# Patient Record
Sex: FEMALE | URBAN - METROPOLITAN AREA
[De-identification: names, ages, dates, MRNs, and addresses within clinical notes are randomized per-mention and may not be internally consistent; named-entity substitution may affect disease eponyms.]

---

## 2021-02-14 ENCOUNTER — IMMUNIZATIONS (OUTPATIENT)
Dept: FAMILY MEDICINE CLINIC | Facility: HOSPITAL | Age: 76
End: 2021-02-14

## 2021-02-14 DIAGNOSIS — Z23 ENCOUNTER FOR IMMUNIZATION: Primary | ICD-10-CM

## 2021-02-14 PROCEDURE — 91300 SARS-COV-2 / COVID-19 MRNA VACCINE (PFIZER-BIONTECH) 30 MCG: CPT

## 2021-02-14 PROCEDURE — 0001A SARS-COV-2 / COVID-19 MRNA VACCINE (PFIZER-BIONTECH) 30 MCG: CPT

## 2021-03-09 ENCOUNTER — IMMUNIZATIONS (OUTPATIENT)
Dept: FAMILY MEDICINE CLINIC | Facility: HOSPITAL | Age: 76
End: 2021-03-09

## 2021-03-09 DIAGNOSIS — Z23 ENCOUNTER FOR IMMUNIZATION: Primary | ICD-10-CM

## 2021-03-09 PROCEDURE — 91300 SARS-COV-2 / COVID-19 MRNA VACCINE (PFIZER-BIONTECH) 30 MCG: CPT

## 2021-03-09 PROCEDURE — 0002A SARS-COV-2 / COVID-19 MRNA VACCINE (PFIZER-BIONTECH) 30 MCG: CPT

## 2023-07-04 ENCOUNTER — HOSPITAL ENCOUNTER (EMERGENCY)
Facility: HOSPITAL | Age: 78
Discharge: HOME | End: 2023-07-04
Attending: EMERGENCY MEDICINE | Admitting: EMERGENCY MEDICINE
Payer: MEDICARE

## 2023-07-04 ENCOUNTER — APPOINTMENT (EMERGENCY)
Dept: CT IMAGING | Facility: HOSPITAL | Age: 78
End: 2023-07-04
Payer: MEDICARE

## 2023-07-04 ENCOUNTER — APPOINTMENT (EMERGENCY)
Dept: RADIOLOGY | Facility: HOSPITAL | Age: 78
End: 2023-07-04
Payer: MEDICARE

## 2023-07-04 VITALS
TEMPERATURE: 98.3 F | RESPIRATION RATE: 17 BRPM | BODY MASS INDEX: 41.54 KG/M2 | OXYGEN SATURATION: 95 % | WEIGHT: 220 LBS | HEART RATE: 82 BPM | DIASTOLIC BLOOD PRESSURE: 66 MMHG | HEIGHT: 61 IN | SYSTOLIC BLOOD PRESSURE: 154 MMHG

## 2023-07-04 DIAGNOSIS — I10 HYPERTENSION: ICD-10-CM

## 2023-07-04 DIAGNOSIS — R42 VERTIGO: ICD-10-CM

## 2023-07-04 DIAGNOSIS — R42 DIZZINESS: Primary | ICD-10-CM

## 2023-07-04 LAB
ALBUMIN SERPL BCP-MCNC: 3.9 G/DL (ref 3.5–5)
ALP SERPL-CCNC: 86 U/L (ref 34–104)
ALT SERPL W P-5'-P-CCNC: 14 U/L (ref 7–52)
AMORPH URATE CRY URNS QL MICRO: NORMAL
ANION GAP SERPL CALCULATED.3IONS-SCNC: 8 MMOL/L
AST SERPL W P-5'-P-CCNC: 17 U/L (ref 13–39)
BACTERIA UR QL AUTO: NORMAL /HPF
BASOPHILS # BLD AUTO: 0.03 THOUSANDS/ÂΜL (ref 0–0.1)
BASOPHILS NFR BLD AUTO: 0 % (ref 0–1)
BILIRUB SERPL-MCNC: 0.33 MG/DL (ref 0.2–1)
BILIRUB UR QL STRIP: NEGATIVE
BUN SERPL-MCNC: 11 MG/DL (ref 5–25)
CALCIUM SERPL-MCNC: 9.8 MG/DL (ref 8.4–10.2)
CARDIAC TROPONIN I PNL SERPL HS: 4 NG/L
CHLORIDE SERPL-SCNC: 105 MMOL/L (ref 96–108)
CLARITY UR: CLEAR
CO2 SERPL-SCNC: 27 MMOL/L (ref 21–32)
COLOR UR: ABNORMAL
CREAT SERPL-MCNC: 0.83 MG/DL (ref 0.6–1.3)
EOSINOPHIL # BLD AUTO: 0.12 THOUSAND/ÂΜL (ref 0–0.61)
EOSINOPHIL NFR BLD AUTO: 2 % (ref 0–6)
ERYTHROCYTE [DISTWIDTH] IN BLOOD BY AUTOMATED COUNT: 13.2 % (ref 11.6–15.1)
GFR SERPL CREATININE-BSD FRML MDRD: 68 ML/MIN/1.73SQ M
GLUCOSE SERPL-MCNC: 149 MG/DL (ref 65–140)
GLUCOSE UR STRIP-MCNC: NEGATIVE MG/DL
HCT VFR BLD AUTO: 39.7 % (ref 34.8–46.1)
HGB BLD-MCNC: 12.4 G/DL (ref 11.5–15.4)
HGB UR QL STRIP.AUTO: ABNORMAL
IMM GRANULOCYTES # BLD AUTO: 0.02 THOUSAND/UL (ref 0–0.2)
IMM GRANULOCYTES NFR BLD AUTO: 0 % (ref 0–2)
KETONES UR STRIP-MCNC: NEGATIVE MG/DL
LEUKOCYTE ESTERASE UR QL STRIP: NEGATIVE
LYMPHOCYTES # BLD AUTO: 1.83 THOUSANDS/ÂΜL (ref 0.6–4.47)
LYMPHOCYTES NFR BLD AUTO: 22 % (ref 14–44)
MCH RBC QN AUTO: 27.3 PG (ref 26.8–34.3)
MCHC RBC AUTO-ENTMCNC: 31.2 G/DL (ref 31.4–37.4)
MCV RBC AUTO: 87 FL (ref 82–98)
MONOCYTES # BLD AUTO: 0.44 THOUSAND/ÂΜL (ref 0.17–1.22)
MONOCYTES NFR BLD AUTO: 5 % (ref 4–12)
NEUTROPHILS # BLD AUTO: 5.79 THOUSANDS/ÂΜL (ref 1.85–7.62)
NEUTS SEG NFR BLD AUTO: 71 % (ref 43–75)
NITRITE UR QL STRIP: NEGATIVE
NON-SQ EPI CELLS URNS QL MICRO: NORMAL /HPF
NRBC BLD AUTO-RTO: 0 /100 WBCS
PH UR STRIP.AUTO: 6.5 [PH]
PLATELET # BLD AUTO: 213 THOUSANDS/UL (ref 149–390)
PMV BLD AUTO: 10.2 FL (ref 8.9–12.7)
POTASSIUM SERPL-SCNC: 3.7 MMOL/L (ref 3.5–5.3)
PROT SERPL-MCNC: 8.2 G/DL (ref 6.4–8.4)
PROT UR STRIP-MCNC: NEGATIVE MG/DL
RBC # BLD AUTO: 4.55 MILLION/UL (ref 3.81–5.12)
RBC #/AREA URNS AUTO: NORMAL /HPF
SODIUM SERPL-SCNC: 140 MMOL/L (ref 135–147)
SP GR UR STRIP.AUTO: 1.01 (ref 1–1.03)
UROBILINOGEN UR STRIP-ACNC: <2 MG/DL
WBC # BLD AUTO: 8.23 THOUSAND/UL (ref 4.31–10.16)
WBC #/AREA URNS AUTO: NORMAL /HPF

## 2023-07-04 PROCEDURE — 71045 X-RAY EXAM CHEST 1 VIEW: CPT

## 2023-07-04 PROCEDURE — 70496 CT ANGIOGRAPHY HEAD: CPT

## 2023-07-04 PROCEDURE — 93005 ELECTROCARDIOGRAM TRACING: CPT

## 2023-07-04 PROCEDURE — 84484 ASSAY OF TROPONIN QUANT: CPT | Performed by: EMERGENCY MEDICINE

## 2023-07-04 PROCEDURE — 81001 URINALYSIS AUTO W/SCOPE: CPT | Performed by: EMERGENCY MEDICINE

## 2023-07-04 PROCEDURE — G1004 CDSM NDSC: HCPCS

## 2023-07-04 PROCEDURE — 70498 CT ANGIOGRAPHY NECK: CPT

## 2023-07-04 PROCEDURE — 36415 COLL VENOUS BLD VENIPUNCTURE: CPT | Performed by: EMERGENCY MEDICINE

## 2023-07-04 PROCEDURE — 85025 COMPLETE CBC W/AUTO DIFF WBC: CPT | Performed by: EMERGENCY MEDICINE

## 2023-07-04 PROCEDURE — 80053 COMPREHEN METABOLIC PANEL: CPT | Performed by: EMERGENCY MEDICINE

## 2023-07-04 RX ORDER — DIAZEPAM 2 MG/1
2 TABLET ORAL ONCE
Status: COMPLETED | OUTPATIENT
Start: 2023-07-04 | End: 2023-07-04

## 2023-07-04 RX ORDER — PRAVASTATIN SODIUM 80 MG/1
80 TABLET ORAL DAILY
COMMUNITY

## 2023-07-04 RX ORDER — LOSARTAN POTASSIUM 25 MG/1
25 TABLET ORAL ONCE
Status: DISCONTINUED | OUTPATIENT
Start: 2023-07-04 | End: 2023-07-04 | Stop reason: HOSPADM

## 2023-07-04 RX ORDER — MONTELUKAST SODIUM 10 MG/1
10 TABLET ORAL
COMMUNITY

## 2023-07-04 RX ORDER — OMEPRAZOLE 20 MG/1
20 CAPSULE, DELAYED RELEASE ORAL DAILY
COMMUNITY

## 2023-07-04 RX ORDER — CARVEDILOL PHOSPHATE 40 MG/1
40 CAPSULE, EXTENDED RELEASE ORAL DAILY
COMMUNITY

## 2023-07-04 RX ADMIN — IOHEXOL 85 ML: 350 INJECTION, SOLUTION INTRAVENOUS at 20:15

## 2023-07-04 RX ADMIN — DIAZEPAM 2 MG: 2 TABLET ORAL at 19:08

## 2023-07-04 RX ADMIN — DIAZEPAM 2 MG: 2 TABLET ORAL at 20:48

## 2023-07-04 NOTE — Clinical Note
Case was discussed with hospitalist and the patient's admission status was agreed to be Admission Status: observation status to the service of Dr. Kelley Castanon .

## 2023-07-04 NOTE — ED PROVIDER NOTES
History  Chief Complaint   Patient presents with   • Dizziness     Patient presents to the ED with c/o vertigo episode that began through the night last evening, states meclizine 30 minutes PTA with slight but minimal relief      Patient is a 69year old female who presents with dizziness. Patient reports more and more frequent episodes of dizziness. Most recent episode started last night while sleeping over her daughter's house. States that she woke up in the middle of the night and had room spinning sensation. Went back to sleep, but throughout today, any time she moved, she would get the dizziness again- it is to the degree that she cannot function. Took 3 pills of meclizine 30 minutes prior to arrival to the ED- no change in symptoms. States that she has right ear pain that is ongoing x few weeks. No change in the pain with this particular episode of vertigo. Denies HA, numbness, weakness, nausea, vomiting, changes in vision. Prior to Admission Medications   Prescriptions Last Dose Informant Patient Reported? Taking? CLONAZEPAM PO 7/4/2023  Yes Yes   Sig: Take 0.5 mg by mouth 2 (two) times a day   Dulaglutide (TRULICITY SC)  Self Yes No   Sig: Inject under the skin once a week   LOSARTAN POTASSIUM PO 7/3/2023  Yes Yes   Sig: Take 25 mg by mouth daily   MECLIZINE HCL PO 7/4/2023  Yes Yes   Sig: Take by mouth   carvedilol (COREG CR) 40 MG 24 hr capsule Not Taking  Yes No   Sig: Take 40 mg by mouth daily   Patient not taking: Reported on 7/4/2023   montelukast (SINGULAIR) 10 mg tablet 7/3/2023  Yes Yes   Sig: Take 10 mg by mouth   omeprazole (PriLOSEC) 20 mg delayed release capsule 7/3/2023  Yes Yes   Sig: Take 20 mg by mouth daily   pravastatin (PRAVACHOL) 80 mg tablet 7/3/2023  Yes Yes   Sig: Take 80 mg by mouth daily      Facility-Administered Medications: None       Past Medical History:   Diagnosis Date   • Hypertension        History reviewed. No pertinent surgical history.     History reviewed. No pertinent family history. I have reviewed and agree with the history as documented. E-Cigarette/Vaping     E-Cigarette/Vaping Substances     Social History     Tobacco Use   • Smoking status: Former     Types: Cigarettes   • Smokeless tobacco: Never   Substance Use Topics   • Alcohol use: Not Currently   • Drug use: Not Currently       Review of Systems   Constitutional: Negative for chills and fever. HENT: Positive for ear pain. Eyes: Negative for photophobia and visual disturbance. Respiratory: Negative for shortness of breath. Cardiovascular: Negative for chest pain. Gastrointestinal: Negative for abdominal pain, nausea and vomiting. Musculoskeletal: Negative for back pain and neck pain. Neurological: Positive for dizziness. Negative for seizures, syncope, facial asymmetry, speech difficulty, weakness, light-headedness, numbness and headaches. Psychiatric/Behavioral: Negative for confusion. Physical Exam  Physical Exam  Vitals and nursing note reviewed. Constitutional:       General: She is not in acute distress. Appearance: Normal appearance. She is not ill-appearing, toxic-appearing or diaphoretic. HENT:      Head: Normocephalic and atraumatic. Mouth/Throat:      Mouth: Mucous membranes are moist.   Eyes:      Extraocular Movements: Extraocular movements intact. Right eye: No nystagmus. Left eye: No nystagmus. Conjunctiva/sclera: Conjunctivae normal.      Pupils: Pupils are equal, round, and reactive to light. Cardiovascular:      Rate and Rhythm: Normal rate and regular rhythm. Pulses: Normal pulses. Heart sounds: Normal heart sounds. No murmur heard. Pulmonary:      Effort: Pulmonary effort is normal. No respiratory distress. Breath sounds: Normal breath sounds. No stridor. No wheezing, rhonchi or rales. Chest:      Chest wall: No tenderness. Abdominal:      General: Bowel sounds are normal. There is no distension. Palpations: Abdomen is soft. Tenderness: There is no abdominal tenderness. There is no guarding or rebound. Musculoskeletal:      Cervical back: Normal range of motion and neck supple. No rigidity. Skin:     General: Skin is warm and dry. Neurological:      General: No focal deficit present. Mental Status: She is alert and oriented to person, place, and time. Mental status is at baseline. GCS: GCS eye subscore is 4. GCS verbal subscore is 5. GCS motor subscore is 6. Cranial Nerves: Cranial nerves 2-12 are intact. No cranial nerve deficit. Sensory: Sensation is intact. Motor: Weakness (chronic right foot weankess- history of polio in a brace) present. No abnormal muscle tone or pronator drift.       Coordination: Finger-Nose-Finger Test normal.   Psychiatric:         Mood and Affect: Mood normal.         Behavior: Behavior normal.         Vital Signs  ED Triage Vitals [07/04/23 1836]   Temperature Pulse Respirations Blood Pressure SpO2   98.3 °F (36.8 °C) 82 18 (!) 202/88 94 %      Temp Source Heart Rate Source Patient Position - Orthostatic VS BP Location FiO2 (%)   Oral Monitor Sitting Right arm --      Pain Score       --           Vitals:    07/04/23 1915 07/04/23 1930 07/04/23 2000 07/04/23 2045   BP:  (!) 187/86 (!) 171/72 (!) 199/81   Pulse: 77 77 79 85   Patient Position - Orthostatic VS:             Visual Acuity  Visual Acuity    Flowsheet Row Most Recent Value   L Pupil Size (mm) 3   R Pupil Size (mm) 3          ED Medications  Medications   losartan (COZAAR) tablet 25 mg (has no administration in time range)   diazepam (VALIUM) tablet 2 mg (2 mg Oral Given 7/4/23 1908)   iohexol (OMNIPAQUE) 350 MG/ML injection (SINGLE-DOSE) 85 mL (85 mL Intravenous Given 7/4/23 2015)   diazepam (VALIUM) tablet 2 mg (2 mg Oral Given 7/4/23 2048)       Diagnostic Studies  Results Reviewed     Procedure Component Value Units Date/Time    Urine Microscopic [[id number]] Collected: 07/04/23 2032    Lab Status: Final result Specimen: Urine, Clean Catch Updated: 07/04/23 2051     RBC, UA 1-2 /hpf      WBC, UA 0-1 /hpf      Epithelial Cells Occasional /hpf      Bacteria, UA Occasional /hpf      Amorphous Crystals, UA Occasional    UA w Reflex to Microscopic w Reflex to Culture [[id number]]  (Abnormal) Collected: 07/04/23 2032    Lab Status: Final result Specimen: Urine, Clean Catch Updated: 07/04/23 2040     Color, UA Light Yellow     Clarity, UA Clear     Specific Gravity, UA 1.010     pH, UA 6.5     Leukocytes, UA Negative     Nitrite, UA Negative     Protein, UA Negative mg/dl      Glucose, UA Negative mg/dl      Ketones, UA Negative mg/dl      Urobilinogen, UA <2.0 mg/dl      Bilirubin, UA Negative     Occult Blood, UA Trace    HS Troponin 0hr (reflex protocol) [[id number]]  (Normal) Collected: 07/04/23 1855    Lab Status: Final result Specimen: Blood from Arm, Left Updated: 07/04/23 1921     hs TnI 0hr 4 ng/L     Comprehensive metabolic panel [[id number]]  (Abnormal) Collected: 07/04/23 1855    Lab Status: Final result Specimen: Blood from Arm, Left Updated: 07/04/23 1914     Sodium 140 mmol/L      Potassium 3.7 mmol/L      Chloride 105 mmol/L      CO2 27 mmol/L      ANION GAP 8 mmol/L      BUN 11 mg/dL      Creatinine 0.83 mg/dL      Glucose 149 mg/dL      Calcium 9.8 mg/dL      AST 17 U/L      ALT 14 U/L      Alkaline Phosphatase 86 U/L      Total Protein 8.2 g/dL      Albumin 3.9 g/dL      Total Bilirubin 0.33 mg/dL      eGFR 68 ml/min/1.73sq m     Narrative:      [place] guidelines for Chronic Kidney Disease (CKD):   •  Stage 1 with normal or high GFR (GFR > 90 mL/min/1.73 square meters)  •  Stage 2 Mild CKD (GFR = 60-89 mL/min/1.73 square meters)  •  Stage 3A Moderate CKD (GFR = 45-59 mL/min/1.73 square meters)  •  Stage 3B Moderate CKD (GFR = 30-44 mL/min/1.73 square meters)  •  Stage 4 Severe CKD (GFR = 15-29 mL/min/1.73 square meters)  •  Stage 5 End Stage CKD (GFR <15 mL/min/1.73 square meters)  Note: GFR calculation is accurate only with a steady state creatinine    CBC and differential [[id number]]  (Abnormal) Collected: 07/04/23 1855    Lab Status: Final result Specimen: Blood from Arm, Left Updated: 07/04/23 1859     WBC 8.23 Thousand/uL      RBC 4.55 Million/uL      Hemoglobin 12.4 g/dL      Hematocrit 39.7 %      MCV 87 fL      MCH 27.3 pg      MCHC 31.2 g/dL      RDW 13.2 %      MPV 10.2 fL      Platelets 205 Thousands/uL      nRBC 0 /100 WBCs      Neutrophils Relative 71 %      Immat GRANS % 0 %      Lymphocytes Relative 22 %      Monocytes Relative 5 %      Eosinophils Relative 2 %      Basophils Relative 0 %      Neutrophils Absolute 5.79 Thousands/µL      Immature Grans Absolute 0.02 Thousand/uL      Lymphocytes Absolute 1.83 Thousands/µL      Monocytes Absolute 0.44 Thousand/µL      Eosinophils Absolute 0.12 Thousand/µL      Basophils Absolute 0.03 Thousands/µL                  CTA head and neck with and without contrast   Final Result by Ryanne Galindo MD (07/04 2116)      1. No acute intracranial hemorrhage, mass effect or extra-axial collection. 2.  No hemodynamically significant stenosis, dissection or occlusion of the carotid or vertebral arteries. 3.  No intracranial aneurysm. No hemodynamically significant stenosis or occlusion of the major vessels of the [place].                   Workstation performed: [id number]         XR chest 1 view portable    (Results Pending)              Procedures  ECG 12 Lead Documentation Only    Date/Time: 7/4/2023 7:02 PM    Performed by: Yasmin Leos DO  Authorized by: Yasmin Leos DO    Indications / Diagnosis:  Dizziness  ECG reviewed by me, the ED Provider: yes    Patient location:  ED  Previous ECG:     Previous ECG:  Unavailable  Interpretation:     Interpretation: non-specific    Rate:     ECG rate:  86    ECG rate assessment: normal    Rhythm:     Rhythm: sinus rhythm    Ectopy:     Ectopy: none QRS:     QRS axis:  Normal    QRS intervals:  Normal  Conduction:     Conduction: normal    ST segments:     ST segments:  Normal  T waves:     T waves: non-specific    Comments:      qtc 423             ED Course  ED Course as of 07/04/23 2131 Tue Jul 04, 2023   1926 hs TnI 0hr: 4   2033 And felt slightly improved after 2 mg of the Valium, but after laying flat for CT imaging, the vertigo returned significantly worse. Will order another 2 mg of Valium at this time. Awaiting CTA results. Patient will likely require admission for symptomatic management of vertigo due to already having taken meclizine and now on her second dose of Valium. 2122 Patient is now feeling significant improvement s/p 2nd dose of valium. She states that she got up to go to the bathroom and was able to walk and move without recurrence of symptoms. Patient is ready for DC. She states that she follows with a vestibular specialist in the outpatient setting and will call for followup. Reviewed strict return precautions which patient verbalized understanding of.    2127 Ordered patient her dose of losartan as she did not take her antihypertensive today and her BP has been consistently high. No HA/ CP. SBIRT 20yo+    Flowsheet Row Most Recent Value   Initial Alcohol Screen: US AUDIT-C     1. How often do you have a drink containing alcohol? 0 Filed at: 07/04/2023 1838   2. How many drinks containing alcohol do you have on a typical day you are drinking? 0 Filed at: 07/04/2023 1838   3a. Male UNDER 65: How often do you have five or more drinks on one occasion? 0 Filed at: 07/04/2023 1838   3b. FEMALE Any Age, or MALE 65+: How often do you have 4 or more drinks on one occassion? 0 Filed at: 07/04/2023 1838   Audit-C Score 0 Filed at: 07/04/2023 3116   HUSAM: How many times in the past year have you. .. Used an illegal drug or used a prescription medication for non-medical reasons?  Never Filed at: 07/04/2023 [address] Making  Assessment and Plan:  Differential includes benign vertigo v. Central. Patient states that vertigo resolves when she is still, but any time she moves even the slightest amount, she starts to spin. Due to the fact that patient reports no imaging for this issue previously, will obtain CTA head and neck, but clinically, does appear to be benign vertigo. Patient already took 75mg meclizine 30 minutes prior to arrival, so will hold on any additional meclizine at this time. Will treat with valium and reassess. Also, will check labs to assess for anemia, electrolyte abnormalities, ekg/tropnin to assess for arrhythmia/ ischemia. 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 Patient was having right ear pain and dizziness with room spinning sensation, was prescribed antibiotics for possible ear infection, but then got reevaluated and was told that she does not have an ear infection and was recommended to obtain vestibular therapy. Of note, patient's other past medical history includes diabetes, polio with right-sided weakness with right upper extremity weakness having resolved, but right lower extremity weakness continuing and requiring a brace. Amount and/or Complexity of Data Reviewed  Labs: ordered. Decision-making details documented in ED Course. Radiology: ordered. Risk  Prescription drug management.           Disposition  Final diagnoses:   Dizziness   Vertigo   Hypertension     Time reflects when diagnosis was documented in both MDM as applicable and the Disposition within this note     Time User Action Codes Description Comment    7/4/2023  8:33 PM Tanesha Alberts Add [R42] Dizziness     7/4/2023  8:33 PM Tanesha Alberts Add [R42] Vertigo 7/4/2023  9:28 PM Saima Posey Add [I10] Hypertension       ED Disposition     ED Disposition   Discharge    Condition   Stable    Date/Time   Tue Jul 4, 2023  9:27 PM    Comment   Case was discussed with hospitalist and the patient's admission status was agreed to be Admission Status: observation status to the service of Dr. Matthieu Rivera . Follow-up Information     Follow up With Specialties Details Why Contact Info Additional Information    Carmen Virk DO Family Medicine Schedule an appointment as soon as possible for a visit in 3 days for re-evaluation [address] [facility] [id number] [facility] Emergency Department Emergency Medicine Go to  As needed, If symptoms worsen, for re-evaluation [address] [facility] [phone]  [address] [facility] Emergency Department, [address] [facility], [address] [place] [address],[address]          Patient's Medications   Discharge Prescriptions    No medications on file       No discharge procedures on file.     PDMP Review     None          ED Provider  Electronically Signed by           Saima Posey DO  07/04/23 5709

## 2023-07-05 NOTE — ED NOTES
Patient reports not taking any of her nomal BP medication, provider notified via tiger text.       John Santacruz RN  07/04/23 2059

## 2023-07-06 LAB
ATRIAL RATE: 86 BPM
P AXIS: 71 DEGREES
PR INTERVAL: 152 MS
QRS AXIS: 4 DEGREES
QRSD INTERVAL: 64 MS
QT INTERVAL: 354 MS
QTC INTERVAL: 423 MS
T WAVE AXIS: 5 DEGREES
VENTRICULAR RATE: 86 BPM

## 2023-07-06 PROCEDURE — 93010 ELECTROCARDIOGRAM REPORT: CPT | Performed by: INTERNAL MEDICINE

## 2023-10-16 ENCOUNTER — TELEPHONE (OUTPATIENT)
Dept: SCHEDULING | Facility: CLINIC | Age: 78
End: 2023-10-16
Payer: COMMERCIAL

## 2023-10-16 NOTE — TELEPHONE ENCOUNTER
New Patient Appointment Request    Name of caller: Kandy Gastelum     Reason for Visit:  Elevated BP 830AM (190/90)    Insurance: [facility]     Insurance ID #: [id number]    Recent Cardiac Test/Procedures: none    Referred by: self (previous pt)    Primary Care Physician: Tommy Mcnair DO     Previous Cardiologist name and phone number: Dr. Rosado (not sure when last appt was)    Best contact number: [phone]     Additional notes/History: Pt is asking for appt w/ Dr. Rosado today. Pt is adamant about being scheduled today and is declining ED. Pt states that she is a friend of Dr. Rosado. Pt states that she went to Urgent Care yesterday and SBP was 170. Please advise?

## 2023-10-16 NOTE — TELEPHONE ENCOUNTER
Pt went to [facility]. Her EKG and bloodwork was perfectly normal. Pt to reach out to pcp to change medication.    Pt will call back when she is ready to make an appt with Dr Rosado

## 2023-10-16 NOTE — TELEPHONE ENCOUNTER
Lm luh Gaitan that Dr Jesus next available is in [place] on 11/22/23 and she should see her PCP and set up a appointment with Dr Jesus's next available. Thanks